# Patient Record
Sex: FEMALE | Race: BLACK OR AFRICAN AMERICAN
[De-identification: names, ages, dates, MRNs, and addresses within clinical notes are randomized per-mention and may not be internally consistent; named-entity substitution may affect disease eponyms.]

---

## 2020-10-27 ENCOUNTER — HOSPITAL ENCOUNTER (OUTPATIENT)
Dept: HOSPITAL 92 - SCSULT | Age: 60
Discharge: HOME | End: 2020-10-27
Attending: INTERNAL MEDICINE
Payer: COMMERCIAL

## 2020-10-27 DIAGNOSIS — N18.4: Primary | ICD-10-CM

## 2020-10-27 PROCEDURE — 76770 US EXAM ABDO BACK WALL COMP: CPT

## 2020-10-27 NOTE — ULT
US Renal Bilateral STANDARD: 10/27/2020 12:00 AM



CLINICAL HISTORY: Chronic kidney disease.



STUDY: Renal ultrasound



COMPARISON: None.                  



FINDINGS:



Right kidney: 

Echogenicity: Normal. 

Masses/cysts:  None.   

Hydronephrosis: Prominent renal pelvis without calyceal dilatation 

Calcifications: None.  

Length: 7.8 cm



Left kidney: 

Echogenicity: Normal. 

Masses/cysts:  None.   

Hydronephrosis: None. 

Calcifications: None.  

Length: 8.0 cm



Limited visualization of the urinary bladder is unremarkable.



IMPRESSION:

Mild right hydronephrosis versus extrarenal pelvis



Reported By: Oscar Walsh 

Electronically Signed:  10/27/2020 3:05 PM

## 2020-11-13 ENCOUNTER — HOSPITAL ENCOUNTER (EMERGENCY)
Dept: HOSPITAL 92 - ERS | Age: 60
Discharge: HOME | End: 2020-11-13
Payer: COMMERCIAL

## 2020-11-13 DIAGNOSIS — Z79.899: ICD-10-CM

## 2020-11-13 DIAGNOSIS — I10: ICD-10-CM

## 2020-11-13 DIAGNOSIS — E11.9: ICD-10-CM

## 2020-11-13 DIAGNOSIS — K59.00: Primary | ICD-10-CM

## 2020-11-13 DIAGNOSIS — E78.6: ICD-10-CM

## 2020-11-13 DIAGNOSIS — M19.90: ICD-10-CM

## 2020-11-13 PROCEDURE — 99283 EMERGENCY DEPT VISIT LOW MDM: CPT
